# Patient Record
Sex: MALE | Race: WHITE | Employment: FULL TIME | URBAN - NONMETROPOLITAN AREA
[De-identification: names, ages, dates, MRNs, and addresses within clinical notes are randomized per-mention and may not be internally consistent; named-entity substitution may affect disease eponyms.]

---

## 2023-01-10 ENCOUNTER — OFFICE VISIT (OUTPATIENT)
Dept: FAMILY MEDICINE CLINIC | Age: 63
End: 2023-01-10
Payer: COMMERCIAL

## 2023-01-10 VITALS
WEIGHT: 174 LBS | SYSTOLIC BLOOD PRESSURE: 120 MMHG | RESPIRATION RATE: 18 BRPM | OXYGEN SATURATION: 97 % | TEMPERATURE: 98.6 F | HEART RATE: 83 BPM | DIASTOLIC BLOOD PRESSURE: 74 MMHG

## 2023-01-10 DIAGNOSIS — J06.9 UPPER RESPIRATORY TRACT INFECTION, UNSPECIFIED TYPE: Primary | ICD-10-CM

## 2023-01-10 PROCEDURE — 99203 OFFICE O/P NEW LOW 30 MIN: CPT | Performed by: PHYSICIAN ASSISTANT

## 2023-01-10 RX ORDER — CLOPIDOGREL BISULFATE 75 MG/1
TABLET ORAL
COMMUNITY
Start: 2023-01-05

## 2023-01-10 RX ORDER — BENZONATATE 100 MG/1
100-200 CAPSULE ORAL 3 TIMES DAILY PRN
Qty: 42 CAPSULE | Refills: 0 | Status: SHIPPED | OUTPATIENT
Start: 2023-01-10 | End: 2023-01-17

## 2023-01-10 RX ORDER — SACUBITRIL AND VALSARTAN 24; 26 MG/1; MG/1
TABLET, FILM COATED ORAL
COMMUNITY
Start: 2023-01-02

## 2023-01-10 RX ORDER — ATORVASTATIN CALCIUM 40 MG/1
TABLET, FILM COATED ORAL
COMMUNITY
Start: 2022-12-14

## 2023-01-10 RX ORDER — DOXYCYCLINE HYCLATE 100 MG
100 TABLET ORAL 2 TIMES DAILY
Qty: 20 TABLET | Refills: 0 | Status: SHIPPED | OUTPATIENT
Start: 2023-01-10 | End: 2023-01-20

## 2023-01-10 RX ORDER — METOPROLOL SUCCINATE 25 MG/1
TABLET, EXTENDED RELEASE ORAL
COMMUNITY
Start: 2022-12-05

## 2023-01-10 RX ORDER — TRAMADOL HYDROCHLORIDE 50 MG/1
TABLET ORAL
COMMUNITY

## 2023-01-10 RX ORDER — IBUPROFEN 800 MG/1
TABLET ORAL
COMMUNITY

## 2023-01-10 RX ORDER — MELOXICAM 7.5 MG/1
TABLET ORAL
COMMUNITY

## 2023-01-10 RX ORDER — HYDROCODONE BITARTRATE AND ACETAMINOPHEN 5; 325 MG/1; MG/1
TABLET ORAL
COMMUNITY

## 2023-01-10 ASSESSMENT — ENCOUNTER SYMPTOMS
PHOTOPHOBIA: 0
DIARRHEA: 0
ABDOMINAL PAIN: 0
VOMITING: 0
SORE THROAT: 0
NAUSEA: 0
BACK PAIN: 0
COUGH: 1
SHORTNESS OF BREATH: 0

## 2023-01-10 NOTE — PROGRESS NOTES
1/10/23  Wiliam Noel : 1960 Sex: male  Age 65 y.o. Subjective:  Chief Complaint   Patient presents with    Cough     X 4 days    Congestion         71-year-old male presents to the walk-in clinic for evaluation of cough, drainage, runny nose and mucus production that started Saturday to . He states he feels like it is in his chest.  He states 4-1/2 months ago he had bypass surgery. He has not taking any over-the-counter medications. He denies chest pain or shortness of breath. He denies fever, chills, nausea or vomiting. Review of Systems   Constitutional:  Negative for chills and fever. HENT:  Positive for congestion and postnasal drip. Negative for ear pain and sore throat. Eyes:  Negative for photophobia and visual disturbance. Respiratory:  Positive for cough. Negative for shortness of breath. Cardiovascular:  Negative for chest pain. Gastrointestinal:  Negative for abdominal pain, diarrhea, nausea and vomiting. Genitourinary:  Negative for difficulty urinating, dysuria, frequency and urgency. Musculoskeletal:  Negative for back pain, neck pain and neck stiffness. Skin:  Negative for rash. Neurological:  Negative for dizziness, syncope, weakness, light-headedness and headaches. Hematological:  Negative for adenopathy. Does not bruise/bleed easily. Psychiatric/Behavioral:  Negative for agitation and confusion. All other systems reviewed and are negative. PMH:   History reviewed. No pertinent past medical history. History reviewed. No pertinent surgical history. History reviewed. No pertinent family history.     Medications:     Current Outpatient Medications:     atorvastatin (LIPITOR) 40 MG tablet, TAKE 1 TABLET BY MOUTH EVERY DAY, Disp: , Rfl:     clopidogrel (PLAVIX) 75 MG tablet, TAKE 1 TABLET BY MOUTH EVERY DAY, Disp: , Rfl:     metoprolol succinate (TOPROL XL) 25 MG extended release tablet, TAKE 1 TABLET BY MOUTH EVERY DAY, Disp: , Rfl:     ENTRESTO 24-26 MG per tablet, TAKE 1 TABLET BY MOUTH TWICE DAILY, Disp: , Rfl:     HYDROcodone-acetaminophen (NORCO) 5-325 MG per tablet, hydrocodone 5 mg-acetaminophen 325 mg tablet  TAKE 1-2 TABLETS BY MOUTH EVERY 4-6 HOURS AS NEEDED FOR PAIN, Disp: , Rfl:     ibuprofen (ADVIL;MOTRIN) 800 MG tablet, ibuprofen 800 mg tablet  TAKE 1 TABLET BY MOUTH EVERY 6 HOURS AS NEEDED FOR PAIN, Disp: , Rfl:     meloxicam (MOBIC) 7.5 MG tablet, meloxicam 7.5 mg tablet  TAKE 1 TABLET BY MOUTH TWICE DAILY, Disp: , Rfl:     traMADol (ULTRAM) 50 MG tablet, tramadol 50 mg tablet  TAKE 1 TABLET BY MOUTH EVERY 6 HOURS AS NEEDED, Disp: , Rfl:     doxycycline hyclate (VIBRA-TABS) 100 MG tablet, Take 1 tablet by mouth 2 times daily for 10 days, Disp: 20 tablet, Rfl: 0    benzonatate (TESSALON) 100 MG capsule, Take 1-2 capsules by mouth 3 times daily as needed for Cough, Disp: 42 capsule, Rfl: 0    Allergies:     Allergies   Allergen Reactions    Prednisone        Social History:     Social History     Tobacco Use    Smoking status: Never    Smokeless tobacco: Never       Patient lives at home.    Physical Exam:     Vitals:    01/10/23 0911   BP: 120/74   Pulse: 83   Resp: 18   Temp: 98.6 °F (37 °C)   TempSrc: Temporal   SpO2: 97%   Weight: 174 lb (78.9 kg)       Exam:  Physical Exam  Vitals and nursing note reviewed.   Constitutional:       General: He is not in acute distress.     Appearance: He is well-developed.   HENT:      Head: Normocephalic and atraumatic.      Right Ear: Tympanic membrane normal.      Left Ear: Tympanic membrane normal.      Nose: Nose normal.      Mouth/Throat:      Mouth: Mucous membranes are moist.      Pharynx: Oropharynx is clear.   Eyes:      Conjunctiva/sclera: Conjunctivae normal.      Pupils: Pupils are equal, round, and reactive to light.   Cardiovascular:      Rate and Rhythm: Normal rate and regular rhythm.   Pulmonary:      Effort: Pulmonary effort is normal. No respiratory distress.       Breath sounds: Normal breath sounds. Abdominal:      General: Bowel sounds are normal.      Palpations: Abdomen is soft. Tenderness: There is no abdominal tenderness. Musculoskeletal:         General: Normal range of motion. Cervical back: Normal range of motion. No rigidity. Lymphadenopathy:      Cervical: No cervical adenopathy. Skin:     General: Skin is warm and dry. Neurological:      General: No focal deficit present. Mental Status: He is alert and oriented to person, place, and time. Mental status is at baseline. Psychiatric:         Mood and Affect: Mood normal.         Behavior: Behavior normal.         Thought Content: Thought content normal.         Judgment: Judgment normal.         Testing:           Medical Decision Making:       Patient upon arrival did not appear toxic or lethargic. Vital signs were reviewed. Past medical history reviewed. Allergies reviewed. Medications reviewed. Patient is presenting with the above complaint of cough and congestion. Differential diagnosis was discussed with the patient. Patient will be given a prescription for doxycycline and Tessalon Perles. Patient may use over-the-counter analgesics as needed. Patient is continue to monitor symptoms and follow-up with their primary care provider in 3-5 days if no improvement. Patient will return or go to the emergency department for any worsening symptoms. Patient understands the plan is agreeable. Clinical Impression:   Tracy Calloway was seen today for cough and congestion. Diagnoses and all orders for this visit:    Upper respiratory tract infection, unspecified type    Other orders  -     doxycycline hyclate (VIBRA-TABS) 100 MG tablet; Take 1 tablet by mouth 2 times daily for 10 days  -     benzonatate (TESSALON) 100 MG capsule;  Take 1-2 capsules by mouth 3 times daily as needed for Cough      The patient is to call for any concerns or return if any of the signs or symptoms worsen. The patient is to follow-up with PCP in the next 2-3 days for repeat evaluation repeat assessment or go directly to the emergency department. SIGNATURE: Juliane Rasmussen PA-C